# Patient Record
Sex: FEMALE | Race: WHITE | NOT HISPANIC OR LATINO | Employment: OTHER | URBAN - METROPOLITAN AREA
[De-identification: names, ages, dates, MRNs, and addresses within clinical notes are randomized per-mention and may not be internally consistent; named-entity substitution may affect disease eponyms.]

---

## 2023-10-12 ENCOUNTER — OFFICE VISIT (OUTPATIENT)
Dept: SURGERY | Facility: CLINIC | Age: 73
End: 2023-10-12
Payer: MEDICARE

## 2023-10-12 VITALS
HEIGHT: 59 IN | SYSTOLIC BLOOD PRESSURE: 140 MMHG | DIASTOLIC BLOOD PRESSURE: 80 MMHG | BODY MASS INDEX: 29.43 KG/M2 | WEIGHT: 146 LBS

## 2023-10-12 DIAGNOSIS — K40.90 RIGHT INGUINAL HERNIA: Primary | ICD-10-CM

## 2023-10-12 PROCEDURE — 99203 OFFICE O/P NEW LOW 30 MIN: CPT | Performed by: SURGERY

## 2023-10-12 PROCEDURE — 1160F RVW MEDS BY RX/DR IN RCRD: CPT | Performed by: SURGERY

## 2023-10-12 PROCEDURE — 1159F MED LIST DOCD IN RCRD: CPT | Performed by: SURGERY

## 2023-10-12 RX ORDER — HYDROCHLOROTHIAZIDE 25 MG/1
1 TABLET ORAL DAILY
COMMUNITY

## 2023-10-12 RX ORDER — FAMOTIDINE 10 MG
TABLET ORAL
COMMUNITY

## 2023-10-12 RX ORDER — OMEPRAZOLE 20 MG/1
20 CAPSULE, DELAYED RELEASE ORAL DAILY
COMMUNITY

## 2023-10-12 NOTE — PROGRESS NOTES
Subjective   Ambika Arechiga is a 72 y.o. female who presents to the office in surgical consultation from Russel Wilson MD for right inguinal hernia.    History of present illness  The patient has noticed a bulge in the right groin that is slowly getting larger.  It is present when she is up and active.  There is some mild pain related to the bulge.  She has not had any change in her bowel or bladder function.  Her  had an inguinal hernia and she recognized this area as similar to the bulge he had.    Her only previous abdominal surgery is 2 separate C-sections through Pfannenstiel incision.    Review of Systems   Constitutional:  Negative for fatigue and fever.   Respiratory:  Negative for chest tightness and shortness of breath.    Cardiovascular:  Negative for chest pain and palpitations.   Gastrointestinal:  Positive for abdominal pain. Negative for blood in stool, constipation, diarrhea, nausea and vomiting.     Past Medical History:   Diagnosis Date    Colon polyp     Diverticulitis of colon 2007    History of transfusion 1982    Hypertension     Liver disease 2022    PONV (postoperative nausea and vomiting) 1982     Past Surgical History:   Procedure Laterality Date    EYE SURGERY  2021    FRACTURE SURGERY  2015     Family History   Problem Relation Age of Onset    Arthritis Mother     Depression Mother     Hearing loss Mother     Hypertension Mother      Social History     Socioeconomic History    Marital status:    Tobacco Use    Smoking status: Never    Smokeless tobacco: Never   Vaping Use    Vaping Use: Never used   Substance and Sexual Activity    Alcohol use: Not Currently    Drug use: Never    Sexual activity: Yes     Partners: Male     Birth control/protection: Tubal ligation       Objective   Physical Exam  Constitutional:       Appearance: Normal appearance. She is well-developed. She is not toxic-appearing.   Eyes:      General: No scleral icterus.  Pulmonary:      Effort: Pulmonary  effort is normal. No respiratory distress.   Abdominal:      Palpations: Abdomen is soft.      Tenderness: There is no abdominal tenderness.      Hernia: A hernia is present. Hernia is present in the right inguinal area. There is no hernia in the left inguinal area.      Comments: There is a reducible right inguinal hernia in a medial position adjacent to the pubic tubercle that contains bowel.   Skin:     General: Skin is warm and dry.   Neurological:      Mental Status: She is alert and oriented to person, place, and time.   Psychiatric:         Behavior: Behavior normal.         Thought Content: Thought content normal.         Judgment: Judgment normal.         Assessment & Plan     1.  Right inguinal hernia: The patient has a right inguinal hernia that clearly contains bowel.  This was discussed with her.  She is at risk of developing an incarceration at some point.  It is appropriate to proceed with a right inguinal hernia repair.  Approaches for right inguinal hernia repair were discussed with her.  She will be scheduled for a da Gurpreet robot-assisted laparoscopic right inguinal hernia repair, possible bilateral inguinal hernia repairs.  The patient understands the indications, alternatives, risks, and benefits of the procedure and wishes to proceed.

## 2023-10-12 NOTE — LETTER
October 12, 2023     Russel Wilson MD     Patient: Ambika Arechiga   YOB: 1950   Date of Visit: 10/12/2023     Dear Russel Wilson MD:       Thank you for referring Ambika Arechiga to me for evaluation. Below are the relevant portions of my assessment and plan of care.    If you have questions, please do not hesitate to call me. I look forward to following Ambika along with you.         Sincerely,        Gaurav Oconnor Jr., MD        CC:   No Recipients    Gaurav Oconnor Jr., MD  10/12/23 1904  Sign when Signing Visit  Subjective  Ambika Arechiga is a 72 y.o. female who presents to the office in surgical consultation from Russel Wilson MD for right inguinal hernia.    History of present illness  The patient has noticed a bulge in the right groin that is slowly getting larger.  It is present when she is up and active.  There is some mild pain related to the bulge.  She has not had any change in her bowel or bladder function.  Her  had an inguinal hernia and she recognized this area as similar to the bulge he had.    Her only previous abdominal surgery is 2 separate C-sections through Pfannenstiel incision.    Review of Systems   Constitutional:  Negative for fatigue and fever.   Respiratory:  Negative for chest tightness and shortness of breath.    Cardiovascular:  Negative for chest pain and palpitations.   Gastrointestinal:  Positive for abdominal pain. Negative for blood in stool, constipation, diarrhea, nausea and vomiting.     Past Medical History:   Diagnosis Date    Colon polyp     Diverticulitis of colon 2007    History of transfusion 1982    Hypertension     Liver disease 2022    PONV (postoperative nausea and vomiting) 1982     Past Surgical History:   Procedure Laterality Date    EYE SURGERY  2021    FRACTURE SURGERY  2015     Family History   Problem Relation Age of Onset    Arthritis Mother     Depression Mother     Hearing loss Mother     Hypertension Mother      Social  History     Socioeconomic History    Marital status:    Tobacco Use    Smoking status: Never    Smokeless tobacco: Never   Vaping Use    Vaping Use: Never used   Substance and Sexual Activity    Alcohol use: Not Currently    Drug use: Never    Sexual activity: Yes     Partners: Male     Birth control/protection: Tubal ligation       Objective  Physical Exam  Constitutional:       Appearance: Normal appearance. She is well-developed. She is not toxic-appearing.   Eyes:      General: No scleral icterus.  Pulmonary:      Effort: Pulmonary effort is normal. No respiratory distress.   Abdominal:      Palpations: Abdomen is soft.      Tenderness: There is no abdominal tenderness.      Hernia: A hernia is present. Hernia is present in the right inguinal area. There is no hernia in the left inguinal area.      Comments: There is a reducible right inguinal hernia in a medial position adjacent to the pubic tubercle that contains bowel.   Skin:     General: Skin is warm and dry.   Neurological:      Mental Status: She is alert and oriented to person, place, and time.   Psychiatric:         Behavior: Behavior normal.         Thought Content: Thought content normal.         Judgment: Judgment normal.         Assessment & Plan    1.  Right inguinal hernia: The patient has a right inguinal hernia that clearly contains bowel.  This was discussed with her.  She is at risk of developing an incarceration at some point.  It is appropriate to proceed with a right inguinal hernia repair.  Approaches for right inguinal hernia repair were discussed with her.  She will be scheduled for a da Gurpreet robot-assisted laparoscopic right inguinal hernia repair, possible bilateral inguinal hernia repairs.  The patient understands the indications, alternatives, risks, and benefits of the procedure and wishes to proceed.